# Patient Record
Sex: FEMALE | Race: WHITE | Employment: OTHER | URBAN - METROPOLITAN AREA
[De-identification: names, ages, dates, MRNs, and addresses within clinical notes are randomized per-mention and may not be internally consistent; named-entity substitution may affect disease eponyms.]

---

## 2017-07-05 PROCEDURE — 82962 GLUCOSE BLOOD TEST: CPT

## 2017-07-05 NOTE — ED NOTES
Pt is here with \"\" friend for evaluation of depression.  Friend reports pt has been verbalizing for several days that she is feeling overwhelmed and depressed, when pt was asked if she had had any thoughts of harming herself, she nodded

## 2017-07-06 PROBLEM — F33.9 MAJOR DEPRESSION, RECURRENT (HCC): Status: ACTIVE | Noted: 2017-07-06

## 2017-07-06 NOTE — PROGRESS NOTES
Spoke w/ ER MD, pt to be direct to [place]. Awaiting faxed [facility].  Awaiting call back from Jerry Lindsey MD, Dr. Charlton Sing

## 2017-07-06 NOTE — ED NOTES
THE [facility] ambulance is here to take pt to [place]. Pt continues to be calm and cooperative.

## 2017-07-06 NOTE — ED NOTES
Report given to Ceci martínez RN. Transport paged, pt ambulated to [facility] with assist from cane without incident. Pt in no distress. Vitals remain stable.

## 2017-07-06 NOTE — ED PROVIDER NOTES
Patient Seen in: [facility] Emergency Department    History   Patient presents with:  Eval-P (psychiatric)    Stated Complaint: eval p    HPI    73-year-old female with a lifelong history of depression presents to the emergency department this time Rivaroxaban (XARELTO) 20 MG Oral Tab,  Take 20 mg by mouth daily with food. omeprazole 20 MG Oral Capsule Delayed Release,  Take 20 mg by mouth every morning before breakfast.   furosemide 40 MG Oral Tab,  Take 40 mg by mouth 2 (two) times daily.    [facility] Mental status exam–the patient admits to suicidal ideations and a plan.     ED Course     Labs Reviewed   COMP METABOLIC PANEL (14) - Abnormal; Notable for the following:        Result Value    Glucose 212 (*)     BUN 23 (*)     Creatinine 1.40 (*)     GFR

## 2017-07-06 NOTE — ED NOTES
Blood specimens collected via vena puncture, left AC, tolerated well by pt. Food tray is at bedside as ordered, pt is calm and cooperative, no distress noted, no complaints voiced.

## 2017-07-06 NOTE — ED NOTES
Jasvir Gerardothu reports that they did not receive [facility] that was faxed to them over 1 hr ago. Paperwork was re-faxed. Pt is resting quietly, no distress noted.

## 2017-07-06 NOTE — ED NOTES
[address] [facility] [address] [address] contacted for transport to [facility], eta is 15-20 min

## 2017-07-10 ENCOUNTER — HOSPITAL ENCOUNTER (OUTPATIENT)
Dept: PHYSICAL THERAPY | Facility: HOSPITAL | Age: 72
Setting detail: THERAPIES SERIES
End: 2017-07-10
Payer: MEDICARE

## 2017-07-10 PROCEDURE — 97161 PT EVAL LOW COMPLEX 20 MIN: CPT

## 2017-07-10 PROCEDURE — 97530 THERAPEUTIC ACTIVITIES: CPT

## 2017-07-13 ENCOUNTER — HOSPITAL ENCOUNTER (OUTPATIENT)
Dept: PHYSICAL THERAPY | Facility: HOSPITAL | Age: 72
Setting detail: THERAPIES SERIES
Discharge: HOME | End: 2017-07-13
Payer: MEDICARE

## 2017-07-18 PROBLEM — K21.9 ESOPHAGEAL REFLUX: Status: ACTIVE | Noted: 2017-07-18

## 2017-07-18 PROBLEM — E07.9 THYROID DISEASE: Status: ACTIVE | Noted: 2017-07-18

## 2017-07-18 PROBLEM — D68.51 FACTOR 5 LEIDEN MUTATION, HETEROZYGOUS (HCC): Status: ACTIVE | Noted: 2017-07-18

## 2017-07-18 PROBLEM — I82.403 DVT, BILATERAL LOWER LIMBS (HCC): Status: ACTIVE | Noted: 2017-07-18

## 2017-07-18 PROBLEM — E11.9 DIABETES (HCC): Status: ACTIVE | Noted: 2017-07-18

## 2021-11-24 ENCOUNTER — LAB REQUISITION (OUTPATIENT)
Dept: LAB | Facility: HOSPITAL | Age: 76
End: 2021-11-24
Payer: MEDICAID

## 2021-11-24 DIAGNOSIS — I26.93 SINGLE SUBSEGMENTAL PULMONARY EMBOLISM WITHOUT ACUTE COR PULMONALE (HCC): ICD-10-CM

## 2021-11-24 DIAGNOSIS — N18.30 CHRONIC KIDNEY DISEASE, STAGE 3 UNSPECIFIED (HCC): ICD-10-CM

## 2021-11-24 DIAGNOSIS — I82.90 ACUTE EMBOLISM AND THROMBOSIS OF UNSPECIFIED VEIN: ICD-10-CM

## 2021-11-24 PROCEDURE — 85610 PROTHROMBIN TIME: CPT | Performed by: INTERNAL MEDICINE
